# Patient Record
Sex: MALE
[De-identification: names, ages, dates, MRNs, and addresses within clinical notes are randomized per-mention and may not be internally consistent; named-entity substitution may affect disease eponyms.]

---

## 2020-07-07 ENCOUNTER — TRANSCRIPTION ENCOUNTER (OUTPATIENT)
Age: 60
End: 2020-07-07

## 2021-05-03 ENCOUNTER — APPOINTMENT (OUTPATIENT)
Dept: PULMONOLOGY | Facility: CLINIC | Age: 61
End: 2021-05-03

## 2021-05-03 PROBLEM — Z00.00 ENCOUNTER FOR PREVENTIVE HEALTH EXAMINATION: Status: ACTIVE | Noted: 2021-05-03

## 2021-06-11 ENCOUNTER — APPOINTMENT (OUTPATIENT)
Dept: PULMONOLOGY | Facility: CLINIC | Age: 61
End: 2021-06-11
Payer: COMMERCIAL

## 2021-06-11 VITALS
OXYGEN SATURATION: 98 % | WEIGHT: 203.25 LBS | HEART RATE: 99 BPM | DIASTOLIC BLOOD PRESSURE: 82 MMHG | TEMPERATURE: 98.1 F | RESPIRATION RATE: 16 BRPM | SYSTOLIC BLOOD PRESSURE: 135 MMHG | BODY MASS INDEX: 29.1 KG/M2 | HEIGHT: 70.16 IN

## 2021-06-11 DIAGNOSIS — I10 ESSENTIAL (PRIMARY) HYPERTENSION: ICD-10-CM

## 2021-06-11 DIAGNOSIS — Z78.9 OTHER SPECIFIED HEALTH STATUS: ICD-10-CM

## 2021-06-11 DIAGNOSIS — E11.9 TYPE 2 DIABETES MELLITUS W/OUT COMPLICATIONS: ICD-10-CM

## 2021-06-11 DIAGNOSIS — E78.2 MIXED HYPERLIPIDEMIA: ICD-10-CM

## 2021-06-11 DIAGNOSIS — Z80.1 FAMILY HISTORY OF MALIGNANT NEOPLASM OF TRACHEA, BRONCHUS AND LUNG: ICD-10-CM

## 2021-06-11 PROCEDURE — 99204 OFFICE O/P NEW MOD 45 MIN: CPT

## 2021-06-11 NOTE — PHYSICAL EXAM
[No Acute Distress] : no acute distress [Normal Oropharynx] : normal oropharynx [Enlarged Base of the Tongue] : enlarged base of the tongue [IV] : Mallampati Class: IV [Normal Appearance] : normal appearance [No Neck Mass] : no neck mass [Normal Rate/Rhythm] : normal rate/rhythm [Normal S1, S2] : normal s1, s2 [No Murmurs] : no murmurs [No Resp Distress] : no resp distress [Clear to Auscultation Bilaterally] : clear to auscultation bilaterally [No Abnormalities] : no abnormalities [Benign] : benign [Normal Bowel Sounds] : normal bowel sounds [Normal Gait] : normal gait [No Clubbing] : no clubbing [No Cyanosis] : no cyanosis [No Edema] : no edema [FROM] : FROM [Normal Color/ Pigmentation] : normal color/ pigmentation [No Focal Deficits] : no focal deficits [Oriented x3] : oriented x3 [Normal Affect] : normal affect

## 2021-06-11 NOTE — DISCUSSION/SUMMARY
[FreeTextEntry1] : ATTENDING ATTESTATION\par \par 61 year old here for initial sleep consult. PCP recommend he come see sleep specialist. Pmhx significant for HTN, DMT2, and MIQUEL. Pt was seen by sleep specialist over 15 years ago had an in lab sleep study but never followed up to get his CPAP. Thinks he was told he has severe MIQUEL gained 10-15lbs since study was done. \par \par MIQUEL \par - Ordered split in lab PSG/Titration \par The patient has signs and symptoms suggestive of sleep disordered breathing. Therapeutic modalities were discussed with the patient and a in lab SPLIT sleep study will be scheduled. Discussed with patient the rationale for treatment of MIQUEL including improved quality of life, daytime function and to decrease cardiovascular risks that are associated with untreated MIQUEL. The patient verbalized understanding and is amenable to treatment if necessary. \par \par Follow up upon completion of sleep study.

## 2021-06-11 NOTE — END OF VISIT
[FreeTextEntry3] : I agree with the advanced clinical provider's history, physical examination and plan of care. I personally elicited a history and examined the patient. See above attestation. 45 minutes time spent for patient education related to comorbidities, MIQUEL, documentation.\par \par \par  [Time Spent: ___ minutes] : I have spent [unfilled] minutes of time on the encounter.

## 2021-06-11 NOTE — HISTORY OF PRESENT ILLNESS
[Obstructive Sleep Apnea] : obstructive sleep apnea [Daytime Somnolence] : daytime somnolence [Difficulty Maintaining Sleep] : difficulty maintaining sleep [Frequent Nocturnal Awakening] : frequent nocturnal awakening [Nonrestorative Sleep] : nonrestorative sleep [Snoring] : snoring [Unintentional Sleep while Inactive] : unintentional sleep while inactive [Vivid dreams] : vivid dreams [Witnessed Apneas] : witnessed apneas [DMS] : difficulty maintaining sleep [Lower Extremity Discomfort] : lower extremity discomfort [Never] : never [TextBox_4] : 61 year old here for initial sleep consult. PCP recommend he come see sleep specialist. Pmhx significant for HTN, DMT2, and MIQUEL. Pt was seen by sleep specialist over 15 years ago had an in lab sleep study but never followed up to get his CPAP. Thinks he was told he has severe MIQUEL gained 10-15lbs since study was done. \par \par + Wife hears him gasping at night, Snoring, daytime sleepiness, difficulty staying asleep. No morning headache, waking up feeling un-refreshed, non restorative sleep, drowsy driving, difficulty falling asleep, dry mouth, HAs, dizziness, cp, palps, swelling, cough, sob, bullock. He will go to sleep around 11pm wake up at 6 am and then goes back to bed until 9/10am. No personal hx of heart attack, stroke, cancer, kidney/liver disease. Does not know what medications he takes for DM/HLD/HTN\par \par Naps: not intentional, falls asleep easily\par \par Caffeine intake: Soda Coke 3-4 cups a day last one around 5pm. (no coffe/tea)\par  Second hand smoke as a child \par \par Son - at [facility] this week for myocarditis s/p pfizer shot [Awakes Unrefreshed] : does not awaken unrefreshed [Awakes with Dry Mouth] : does not awaken with dry mouth [Awakes with Headache] : does not awaken with headache [Difficulty Initiating Sleep] : does not have difficulty initiating sleep [Fatigue] : no fatigue [Hypnopompic Hallucinations] : denies hypnopompic hallucinations [Paresthesias] : denies paresthesias [Recent  Weight Gain] : no recent weight gain [Tired while Driving] : not tired while driving [Unintentional Sleep while Active] : no unintentional sleep while active [Unusual Movements] : no unusual movements [DIS] : does not have difficulty initiating sleep [Unusual Sleep Behavior] : no unusual sleep behavior [Hypersomnolence] : no hypersomnolence [Cataplexy] : no cataplexy [Sleep Paralysis] : no sleep paralysis [Hypnagogic Hallucinations] : no hypnagogic hallucinations [Hypnopompic Hallucinations] : no hypnopompic hallucinations [Irresistible urge to move legs] : does not have irresistible urge to move legs [LE discomfort relieved by movement] : denies lower extremity discomfort relieved by movement [TextBox_77] : 11pm [TextBox_85] : 8-9 [TextBox_79] : 10am [TextBox_89] : 3-4 [ESS] : 10

## 2021-07-07 ENCOUNTER — TRANSCRIPTION ENCOUNTER (OUTPATIENT)
Age: 61
End: 2021-07-07

## 2021-07-09 ENCOUNTER — FORM ENCOUNTER (OUTPATIENT)
Age: 61
End: 2021-07-09

## 2021-08-10 ENCOUNTER — APPOINTMENT (OUTPATIENT)
Dept: PULMONOLOGY | Facility: CLINIC | Age: 61
End: 2021-08-10
Payer: COMMERCIAL

## 2021-08-10 DIAGNOSIS — G47.33 OBSTRUCTIVE SLEEP APNEA (ADULT) (PEDIATRIC): ICD-10-CM

## 2021-08-10 PROCEDURE — 99442: CPT

## 2021-08-10 NOTE — REASON FOR VISIT
[Home] : at home, [unfilled] , at the time of the visit. [Medical Office: ([facility])___] : at the medical office located in  [Spouse] : spouse [Follow-Up] : a follow-up visit [Sleep Apnea] : sleep apnea [Verbal consent obtained from patient] : the patient, [unfilled]

## 2021-08-10 NOTE — HISTORY OF PRESENT ILLNESS
[Never] : never [Obstructive Sleep Apnea] : obstructive sleep apnea [Daytime Somnolence] : daytime somnolence [Difficulty Maintaining Sleep] : difficulty maintaining sleep [Frequent Nocturnal Awakening] : frequent nocturnal awakening [Nonrestorative Sleep] : nonrestorative sleep [Snoring] : snoring [Unintentional Sleep while Inactive] : unintentional sleep while inactive [Vivid dreams] : vivid dreams [Witnessed Apneas] : witnessed apneas [DMS] : difficulty maintaining sleep [Lower Extremity Discomfort] : lower extremity discomfort [TextBox_4] : 61 year old M referred by PCP w/ Pmhx significant for HTN, DMT2, and MIQUEL. Pt was seen by sleep specialist over 15 years ago had an in lab sleep study but never followed up to get his CPAP. Thinks he was told he has severe MIQUEL gained 10-15lbs since study was done. \par \par Presents today via TTM for sleep follow up accompanied by wife. No changes in MIQUEL symptoms since last visit. No acute complaints. \par \par MIQUEL: + Wife hears him gasping at night, Snoring, daytime sleepiness, difficulty staying asleep. No morning headache, waking up feeling un-refreshed, non restorative sleep, drowsy driving, difficulty falling asleep, dry mouth, HAs, dizziness, cp, palps, swelling, cough, sob, bullock. Goes to sleep around 11pm wake up at 6 am and then goes back to bed until 9/10am. \par No personal hx of heart attack, stroke, cancer, kidney/liver disease. Does not know what medications he takes for DM/HLD/HTN\par \par Naps: not intentional, falls asleep easily\par \par Caffeine intake: Soda Coke 3-4 cups a day last one around 5pm. (no coffe/tea)\par  Second hand smoke as a child  [Awakes Unrefreshed] : does not awaken unrefreshed [Awakes with Dry Mouth] : does not awaken with dry mouth [Awakes with Headache] : does not awaken with headache [Difficulty Initiating Sleep] : does not have difficulty initiating sleep [Fatigue] : no fatigue [Hypnopompic Hallucinations] : denies hypnopompic hallucinations [Paresthesias] : denies paresthesias [Recent  Weight Gain] : no recent weight gain [Tired while Driving] : not tired while driving [Unintentional Sleep while Active] : no unintentional sleep while active [Unusual Movements] : no unusual movements [DIS] : does not have difficulty initiating sleep [Unusual Sleep Behavior] : no unusual sleep behavior [Hypersomnolence] : no hypersomnolence [Cataplexy] : no cataplexy [Sleep Paralysis] : no sleep paralysis [Hypnagogic Hallucinations] : no hypnagogic hallucinations [Hypnopompic Hallucinations] : no hypnopompic hallucinations [Irresistible urge to move legs] : does not have irresistible urge to move legs [LE discomfort relieved by movement] : denies lower extremity discomfort relieved by movement [TextBox_77] : 11pm [TextBox_79] : 10am [TextBox_85] : 8-9 [TextBox_89] : 3-4 [ESS] : 10

## 2021-08-10 NOTE — END OF VISIT
[FreeTextEntry3] : I personally elicited the history from the patient via telehealth visit..\par \par \par \par  [Time Spent: ___ minutes] : I have spent [unfilled] minutes of time on the encounter.

## 2021-08-10 NOTE — DISCUSSION/SUMMARY
[FreeTextEntry1] : ATTENDING ATTESTATION\par \par 61 year old M referred by PCP w/ Pmhx significant for HTN, DMT2, and MIQUEL. Pt was seen by sleep specialist over 15 years ago had an in lab sleep study but never followed up to get his CPAP. Thinks he was told he has severe MIQUEL gained 10-15lbs since study was done. \par \par Presents today for MIQUEL f/u. Results from sleep study reviewed with pt and his wife. AHI 44.3, Normalized with CPAP of 10. APAP 5-15 ordered for patient. Awaiting delivery. \par \par - Reinforced importance of the rationale for treatment of MIQUEL including improved quality of life, daytime function and to decrease cardiovascular risks that are associated with untreated MIQUEL. The patient and wife verbalized understanding and is amenable to therapy. \par - Advised patient should call our office if they don't hear from the DME within 3 weeks. \par -Schedule f/u appointment 1-2 months after starting PAP therapy. \par \par

## 2021-08-11 ENCOUNTER — APPOINTMENT (OUTPATIENT)
Dept: UROLOGY | Facility: CLINIC | Age: 61
End: 2021-08-11
Payer: COMMERCIAL

## 2021-08-11 VITALS
BODY MASS INDEX: 28.77 KG/M2 | RESPIRATION RATE: 14 BRPM | OXYGEN SATURATION: 97 % | WEIGHT: 201 LBS | HEART RATE: 74 BPM | HEIGHT: 70 IN | TEMPERATURE: 97.8 F | SYSTOLIC BLOOD PRESSURE: 128 MMHG | DIASTOLIC BLOOD PRESSURE: 79 MMHG

## 2021-08-11 DIAGNOSIS — R39.198 OTHER DIFFICULTIES WITH MICTURITION: ICD-10-CM

## 2021-08-11 DIAGNOSIS — Z86.79 PERSONAL HISTORY OF OTHER DISEASES OF THE CIRCULATORY SYSTEM: ICD-10-CM

## 2021-08-11 PROCEDURE — 99203 OFFICE O/P NEW LOW 30 MIN: CPT

## 2021-08-11 PROCEDURE — 51736 URINE FLOW MEASUREMENT: CPT

## 2021-08-11 RX ORDER — TAMSULOSIN HYDROCHLORIDE 0.4 MG/1
0.4 CAPSULE ORAL
Refills: 0 | Status: ACTIVE | COMMUNITY

## 2021-08-11 RX ORDER — METFORMIN HYDROCHLORIDE 500 MG/1
500 TABLET, COATED ORAL
Refills: 0 | Status: ACTIVE | COMMUNITY

## 2021-08-11 RX ORDER — AMLODIPINE BESYLATE 5 MG/1
TABLET ORAL
Refills: 0 | Status: ACTIVE | COMMUNITY

## 2021-08-11 RX ORDER — DAPAGLIFLOZIN 10 MG/1
10 TABLET, FILM COATED ORAL
Refills: 0 | Status: ACTIVE | COMMUNITY

## 2021-08-11 NOTE — LETTER BODY
[Dear  ___] : Dear  [unfilled], [Consult Letter:] : I had the pleasure of evaluating your patient, [unfilled]. [Consult Closing:] : Thank you very much for allowing me to participate in the care of this patient.  If you have any questions, please do not hesitate to contact me. [FreeTextEntry1] : [facility]\par [address] \[place], [place], [address]  \par [phone]

## 2021-08-11 NOTE — ASSESSMENT
[FreeTextEntry1] : It appears that his urinary symptoms and even erections have improved since a few weeks ago when he was started on a new diabetic medication which was Farxiga and also addition of tamsulosin.  PSA level was normal.  Urinalysis will be checked.  He should try to control diabetes better and check his fingersticks at home.  He will continue with tamsulosin for now and follow-up in the future if needed.  At some point he may try to wean himself off of tamsulosin cysts based on ultrasound he has a small prostate.\par \par Henrry Chavez MD, FACS\par [facility]\par  of Urology\par \par [address], [address]\[place] [address]\par \par [address], [address]\[place] [address]\par \par Tel: [phone]\par Fax: [phone]

## 2021-08-11 NOTE — PHYSICAL EXAM
[General Appearance - Well Developed] : well developed [General Appearance - Well Nourished] : well nourished [Normal Appearance] : normal appearance [Well Groomed] : well groomed [General Appearance - In No Acute Distress] : no acute distress [Edema] : no peripheral edema [Respiration, Rhythm And Depth] : normal respiratory rhythm and effort [Exaggerated Use Of Accessory Muscles For Inspiration] : no accessory muscle use [Abdomen Soft] : soft [Abdomen Tenderness] : non-tender [Costovertebral Angle Tenderness] : no ~M costovertebral angle tenderness [FreeTextEntry1] : He declined genital examination today since it was done recently. [Normal Station and Gait] : the gait and station were normal for the patient's age [] : no rash [No Focal Deficits] : no focal deficits [Oriented To Time, Place, And Person] : oriented to person, place, and time [Affect] : the affect was normal [Mood] : the mood was normal [Not Anxious] : not anxious [Inguinal Lymph Nodes Enlarged Bilaterally] : inguinal

## 2021-08-11 NOTE — HISTORY OF PRESENT ILLNESS
[FreeTextEntry1] : He is a 61-year-old man who is seen today for initial visit.  Patient states that he was having slow urinary stream and urinary frequency.  A few weeks ago he was started on tamsulosin and also another diabetic medication was added.  Since then he feels great.  Nocturia is 2 times.  Urinary flow is much stronger.  He also believes that erections have improved.  In June 2021, PSA level was 0.85 and hemoglobin A1c was 8.2.  Bladder ultrasound showed prostate volume 17 g, prevoid volume 289 mL and the postvoid volume was 144 mL from [facility] radiology in July 2021.  He is trying to control diabetes better.\par Uroflow today showed maximum 17, average 9 mL/s, voided 333 cc and the residual urine volume was 70 cc.

## 2021-08-12 LAB
APPEARANCE: CLEAR
BACTERIA: NEGATIVE
BILIRUBIN URINE: NEGATIVE
BLOOD URINE: NEGATIVE
COLOR: NORMAL
GLUCOSE QUALITATIVE U: ABNORMAL
HYALINE CASTS: 0 /LPF
KETONES URINE: NEGATIVE
LEUKOCYTE ESTERASE URINE: NEGATIVE
MICROSCOPIC-UA: NORMAL
NITRITE URINE: NEGATIVE
PH URINE: 5.5
PROTEIN URINE: NEGATIVE
RED BLOOD CELLS URINE: 1 /HPF
SPECIFIC GRAVITY URINE: 1.03
SQUAMOUS EPITHELIAL CELLS: 0 /HPF
UROBILINOGEN URINE: NORMAL
WHITE BLOOD CELLS URINE: 1 /HPF

## 2021-08-25 ENCOUNTER — TRANSCRIPTION ENCOUNTER (OUTPATIENT)
Age: 61
End: 2021-08-25